# Patient Record
Sex: FEMALE | Race: WHITE | NOT HISPANIC OR LATINO | Employment: UNEMPLOYED | URBAN - METROPOLITAN AREA
[De-identification: names, ages, dates, MRNs, and addresses within clinical notes are randomized per-mention and may not be internally consistent; named-entity substitution may affect disease eponyms.]

---
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
## 2025-04-17 NOTE — PROGRESS NOTES
Left voicemail for Re Brenner to call our office back to schedule consult from [facility].    [facility] Pediatric Dermatology Note      Dermatology Problem List:  1. Vitiligo  -Home phototherapy unit - three times a week for 10 minutes  -Protopic 0.1% twice daily    2. Alopecia areata of eyebrows & eyelashes  -Minoxidil 5% solution on eyebrows  -Were considering Latisse last visit    3. History of atopic dermatitis  -Gentle skin care    Encounter Date: Jul 13, 2021    CC: alopecia and vitiligo follow-up      HPI:  Ms. Sveta Dugan is a 16 year old female who presents to clinic today as a return patient for vitiligo and alopecia. Last seen by Dr. William on 7/28/20.     Her vitiligo is about the same as it was a year ago. She has not noticed any new spots or that old spots are getting larger. They got home phototherapy unit and started doing treatments in April 2021. Has been doing phototherapy about twice a week, about 7 sessions total. Started at 40 seconds, now increased 55 seconds. No irritation or side effects, wears protective goggles during treatments. Not using any topical steroids for her vitiligo.     The alopecia of her eyelashes and eyebrows is improving, she now has fine hair growth of both eyebrows and eyelash growth on both sides. She has been using Minoxidil on her eyebrows and eyelashes every night. Last visit they talked about trying Latisse and she is still interested in pursuing this option.    She also has some new dark, rough plaques on her lower legs that have been there consistently for about a year. Not itchy, just very rough. Hasn't tried any treatments for this yet.       Social History:  Patient  reports that she has never smoked. She has never used smokeless tobacco.  Lives with mom, dad, and younger sister.    Past Medical, Social, Family History:   There is no problem list on file for this patient.    No past medical history on file.  No past surgical history on file.  No family history on file.    Medications:  Current Outpatient Medications   Medication Sig  Dispense Refill     DiphenhydrAMINE HCl (BENADRYL PO) Take 5 mLs by mouth daily       hydrocortisone 2.5 % cream Apply topically 2 times daily Twice daily as needed to groin. 30 g 11     ketoconazole (NIZORAL) 2 % external cream Apply topically daily 60 g 3     mometasone (ELOCON) 0.1 % external cream Apply up to twice daily to vitiligo for 8 weeks, then stop. 50 g 1     mupirocin (BACTROBAN) 2 % external ointment Apply topically as needed       tacrolimus (PROTOPIC) 0.1 % external ointment Apply topically 2 times daily 100 g 4        Allergies:  Allergies   Allergen Reactions     Hazelnuts [Nuts]      Mold        ROS:  Constitutional: Otherwise feeling well today, in usual state of health.   Skin: As per HPI   10-point ROS negative     Physical exam:  Vitals: There were no vitals taken for this visit.  GEN: sitting comfortably in chair, pleasant and interactive   PULM: Breathing comfortably in no distress  CV: Well-perfused, no cyanosis  EXTREMITIES: No deformity, no edema  SKIN:   - Fine, thin hair growth on the eyebrows  - Some short eyelash growth on the lower lids  - Scattered, non-confluent hypopigmented macules on the legs, arms, and back  - Brown, hyperpigmented, rough plaques on the lateral surface of the lower legs, thighs, and upper arms  - Scattered erythematous folliculitis of the pubic hair in the groin  - No other lesions of concern on areas examined.     ASSESSMENT/PLAN:  #Vitiligo:   Stable since last visit. Counseled that the more consistent she can be with phototherapy the more results she will see from this treatment.  -Phototherapy 3 times a week for increasing increments of time as tolerated  -Don't recommend topical steroid treatment for alopecia patches at this time    #Alopecia areata of the eyebrows and eyelashes:   Improving, fine hair growth present on eyebrows and lower eyelids  -Latisse for eyelashes and eyebrows once a day  -Can continue minoxidil daily until they are able to get  Latisse    #suspect Lichen amyloidosis:  The rough, hyperpigmented plaques on the legs and arms are thickened areas of skin that are likely secondary to rubbing/scratching  -Use emollient moisturizer daily (Aquaphor, Cetaphil, CeraVe, Vanicream, etc.)  -Mometasone 0.1% cream: apply once a day, use no more than 2 weeks per month  -If not noticing any improvement with Mometasone, can switch to Tacrolimus 0.1% once a day  -could consider biopsy in the future for diagnostic confirmation    #folliculitis, vulvar  This is secondary to skin/hair follicle irritation from shaving. Advised her to use a thick layer of shaving cream and to always use a new razor every time she shaves.  -Can apply tacrolimus 0.1% ointment to these areas daily as needed  -Apply Vaseline or other thick emollient to these areas, especially after shaving    CC Johanna Mir MD  [address] [place] [facility] [place] [facility]",  [place] [id number] on close of this encounter.    Follow-up in 6 months     Patient was staffed with Dr. Stewart Rayo MD  PGY-1 Pediatric Resident  [facility]    I have personally seen and examined this patient.  I agree with the resident's documentation and plan of care.  I have reviewed and amended the note above.  The documentation accurately reflects my clinical observations, diagnoses, treatment and follow-up plans.     Albina William MD  , Pediatric Dermatology

## 2025-08-04 ENCOUNTER — TELEPHONE (OUTPATIENT)
Dept: DERMATOLOGY | Facility: CLINIC | Age: 20
End: 2025-08-04
Payer: COMMERCIAL